# Patient Record
(demographics unavailable — no encounter records)

---

## 2024-10-10 NOTE — PHYSICAL EXAM
[Right] : right foot and ankle [Mild] : mild swelling of lateral foot [1+] : dorsalis pedis pulse: 1+ [] : no mid substance plantar fascia tenderness

## 2024-10-10 NOTE — IMAGING
[Right] : right foot [Weight -] : weightbearing [The fracture is in acceptable alignment. There is progression in healing seen] : The fracture is in acceptable alignment. There is progression in healing seen [de-identified] : calc/cuboid avulsion with no further displacement.

## 2024-10-10 NOTE — HISTORY OF PRESENT ILLNESS
[Sudden] : sudden [5] : 5 [Radiating] : radiating [Ice] : ice [Retired] : Work status: retired [de-identified] : 9/25/24  R foot injured missed a step 0n 9/21/24.  S.  No prior injury.  Seen @ [facility] 9/22/24 and placed in boot  Hx of osteopenia 10/10/2024 Patient is following up on right foot. States she is starting to get pain in the hip with the boot. WB in short cam boot.  [] : no [FreeTextEntry1] : rt foot [FreeTextEntry3] : 9-21-24 [FreeTextEntry5] : pt fell down a step  [FreeTextEntry6] : bruising [FreeTextEntry7] : ankle [FreeTextEntry9] : elevation [de-identified] : [facility] [de-identified] : xr [facility]

## 2024-10-10 NOTE — DISCUSSION/SUMMARY
[de-identified] : Continue CAM boot for another week and a half - may start PT at this time  even up recommended  wbat f/u 5 weeks  Repeat x-ray will be performed at the next office visit (right foot)

## 2024-11-19 NOTE — HISTORY OF PRESENT ILLNESS
[de-identified] : 9/25/24  R foot injured missed a step 0n 9/21/24.  S.  No prior injury.  Seen @ [facility] 9/22/24 and placed in boot  Hx of osteopenia 10/10/2024 Patient is following up on right foot. States she is starting to get pain in the hip with the boot. WB in short cam boot.  11/19/24  f/u R foot  HEP/PT

## 2024-11-19 NOTE — PHYSICAL EXAM
[Right] : right foot and ankle [Mild] : mild swelling of lateral foot [NL (20)] : dorsiflexion 20 degrees [NL (40)] : plantar flexion 40 degrees [1+] : dorsalis pedis pulse: 1+ [] : patient ambulates without assistive device